# Patient Record
Sex: FEMALE | Race: WHITE | NOT HISPANIC OR LATINO | URBAN - NONMETROPOLITAN AREA
[De-identification: names, ages, dates, MRNs, and addresses within clinical notes are randomized per-mention and may not be internally consistent; named-entity substitution may affect disease eponyms.]

---

## 2021-07-01 ENCOUNTER — HOSPITAL ENCOUNTER (EMERGENCY)
Facility: HOSPITAL | Age: 21
Discharge: LEFT BEFORE DISCHARGE | End: 2021-07-02

## 2021-07-01 VITALS
SYSTOLIC BLOOD PRESSURE: 150 MMHG | HEIGHT: 65 IN | TEMPERATURE: 97.6 F | HEART RATE: 126 BPM | WEIGHT: 252 LBS | DIASTOLIC BLOOD PRESSURE: 87 MMHG | RESPIRATION RATE: 20 BRPM | BODY MASS INDEX: 41.99 KG/M2 | OXYGEN SATURATION: 97 %

## 2021-07-01 PROCEDURE — 99211 OFF/OP EST MAY X REQ PHY/QHP: CPT

## 2021-07-02 NOTE — ED NOTES
Called pt to be roomed at this time, pt was unable to be located in ED waiting area or outside of ED.     Bianca Diego RN  07/02/21 0046

## 2021-07-02 NOTE — ED NOTES
Called pt to be roomed at this time, pt was unable to be located in ED waiting area or outside of ED.     Bianca Diego RN  07/02/21 0144